# Patient Record
(demographics unavailable — no encounter records)

---

## 2024-10-30 NOTE — ASSESSMENT
[FreeTextEntry1] : anemia: Recommended continue iron. He will reschedule with Dr Campbell. Check labs.  Depression: Discussed increasing mirtazapine 15mg QHS.  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

## 2024-10-30 NOTE — HISTORY OF PRESENT ILLNESS
[de-identified] : 69M presents for follow-up of anemia, depression. He was unable to do prep for EGD so it had to be rescheduled. Due for bloodwork. Family reports increased difficulty sleeping.

## 2024-10-30 NOTE — PHYSICAL EXAM
[No Acute Distress] : no acute distress [Normal Sclera/Conjunctiva] : normal sclera/conjunctiva [PERRL] : pupils equal round and reactive to light [EOMI] : extraocular movements intact [No Lymphadenopathy] : no lymphadenopathy [Supple] : supple [No Respiratory Distress] : no respiratory distress  [No Accessory Muscle Use] : no accessory muscle use [Clear to Auscultation] : lungs were clear to auscultation bilaterally [Normal Rate] : normal rate  [Regular Rhythm] : with a regular rhythm [Normal S1, S2] : normal S1 and S2 [No Edema] : there was no peripheral edema [Soft] : abdomen soft [Non Tender] : non-tender [Non-distended] : non-distended [Normal Bowel Sounds] : normal bowel sounds [Normal Affect] : the affect was normal [Normal Insight/Judgement] : insight and judgment were intact

## 2024-11-19 NOTE — PHYSICAL EXAM
[2+] : left 2+ [Alert] : alert [Oriented to Person] : oriented to person [Oriented to Place] : oriented to place [Oriented to Time] : oriented to time [Calm] : calm [Ankle Swelling (On Exam)] : not present [Varicose Veins Of Lower Extremities] : not present [] : not present [Abdomen Tenderness] : ~T ~M No abdominal tenderness [de-identified] : NAD [de-identified] : supple, no masses [de-identified] : unlabored breathing [de-identified] : FROM of all 4 extremities

## 2024-11-19 NOTE — ASSESSMENT
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 Patient has a history of mild dementia and is unable to articulate specific complaints other than left arm and shoulder pain.  Mesenteric duplex performed in the office today demonstrates patent aorta, SMA and FAWN. Celiac artery unable to be visualized. No evidence of stenosis or dissection.  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 No extension into mesenteric arteries. Without contrast, unable to evaluate patency or stenosis of aorta/mesenteric arteries.  Plan Patient without symptoms of chronic or acute mesenteric ischemia Risk of CT with IV contrast outweighs benefit for imaging study.  Patient with history of carotid stenosis s/p L CEA in the past, without surveillance follow up. He will follow up in 1-3 months for bilateral carotid duplex to assess for stenosis.

## 2024-11-19 NOTE — HISTORY OF PRESENT ILLNESS
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 Patient has a history of mild dementia and is unable to articulate specific complaints other than left arm and shoulder pain. Denies postprandial abdominal pain, nausea, vomiting, weight loss, change in bowel habits. He has history of CKD stage 3 and last CT scan abd/pelvis was without IV contrast. No evidence of significant calcification to suggest arterial stenosis or occlusion.

## 2024-12-04 NOTE — ADDENDUM
[FreeTextEntry1] :  Jacque SMITH assisted in documentation on 05/29/2024  acting as a scribe for Dr. Cricket Tran.

## 2024-12-15 NOTE — END OF VISIT
[Time Spent: ___ minutes] : I have spent [unfilled] minutes of time on the encounter which excludes teaching and separately reported services. [FreeTextEntry3] : Patient was seen with Physician assistant and examined by me. Agree with above note, where necessary edits were made.  Parts of this note were generated by using Dragon medical dictation software.  A reasonable effort was made to proofread and correct its contents, but typos and mistakes may still remain.If there are any questions or points of clarification needed, please contact my office.   Prior to appointment and during encounter with patient extensive medical records were reviewed including but not limited to, hospital records, outpatient records, imaging results and lab data if available. During this appointment the patient was examined, diagnoses were discussed and explained in a face-to-face manner. In addition, extensive time was spent reviewing aforementioned diagnostic studies. Counseling including test results, differential diagnoses, treatment options, risk and benefits, lifestyle changes, current condition, and current medications was performed. Patient's questions and concerns were addressed. Patient verbalized understanding of the treatment plan. Time spent is for reviewing chart, labs and images if available, counseling and care coordination.

## 2024-12-15 NOTE — HISTORY OF PRESENT ILLNESS
DISPLAY PLAN FREE TEXT [FreeTextEntry1] : 12/04/2024: 69-year-old male presents for follow-up. Accompanied by his daughter who was helping translate: [place].  Forgot to obtain repeat 6-month PSA. Reports reasonable stream, urinates 2-3 times or so during the day and nocturia x1. Denies hesitancy, straining, intermittency, urgency, incontinence or sense of incomplete emptying. Denies dysuria, hematuria, lower abdominal or flank pain, fever, chills or rigors.  PSA: 5.41 (5/7/2024).  MRI prostate (5/18/24): Prostate volume: 47 cc. No MRI-targetable lesion. PIRADS 2.   Seen on 5/10/24 for Elevated PSA.  Patient had dementia.  History provided by accompanying ex-wife. Recently had PSA checked and was told is elevated.  Denied any recent unintentional weight loss, night sweats and new bone or back pain. No family history of Prostate cancer. Reported reasonable stream, urinates 2-3 times or so during the day and nocturia of 1 time. Denied hesitancy, straining, intermittency, urgency, incontinence or sense of incomplete emptying.

## 2024-12-15 NOTE — PHYSICAL EXAM
[Normal Appearance] : normal appearance [General Appearance - In No Acute Distress] : no acute distress [] : no respiratory distress [Normal Station and Gait] : the gait and station were normal for the patient's age [Oriented To Time, Place, And Person] : oriented to person, place, and time [de-identified] : normal peripheral circulation

## 2024-12-15 NOTE — ASSESSMENT
[FreeTextEntry1] : Had discussed non- findings of MRI. Left inguinal hernia, patient has known about it, and it is long-standing. Has no bother with it. For bilateral avascular femoral hip necrosis, recommended seeing orthopedist.     Elevated PSA: Will continue PSA monitoring: Will do free and total PSA now and before follow-up appointment.   Benign Prostatic Hyperplasia: No bothersome lower urinary tract symptoms and minimal post void residual in the past.  Will defer management for now.  Return to office in 6 months or sooner if any issues: will do Uroflow/PVR.

## 2025-03-13 NOTE — REVIEW OF SYSTEMS
[Fever] : no fever [Chills] : no chills [Night Sweats] : no night sweats [Chest Pain] : no chest pain [Palpitations] : no palpitations [Lower Ext Edema] : no lower extremity edema [Shortness Of Breath] : no shortness of breath [Wheezing] : no wheezing [Dyspnea on Exertion] : not dyspnea on exertion [Abdominal Pain] : no abdominal pain [Nausea] : no nausea [Constipation] : no constipation [Diarrhea] : no diarrhea [Vomiting] : no vomiting [Melena] : no melena [Dysuria] : no dysuria

## 2025-03-13 NOTE — ASSESSMENT
[FreeTextEntry1] : anemia: Check CBC, b12/folate, iron/ferritin/TIBC. Treatment based on results.  Depression: Continue mirtazapine 15mg QHS. Discussed increasing to bupropion ER 300mg daily.  CKD: Check BMP, prot/cr. Referred to Dr Javier (nephrology).

## 2025-03-13 NOTE — HISTORY OF PRESENT ILLNESS
[de-identified] : 69M presents for follow-up of depression, anemia, CKD. Due for bloodwork. Daughter reports he seems more depressed with more anhedonia.

## 2025-03-13 NOTE — HISTORY OF PRESENT ILLNESS
[de-identified] : 69M presents for follow-up of depression, anemia, CKD. Due for bloodwork. Daughter reports he seems more depressed with more anhedonia.

## 2025-06-12 NOTE — HISTORY OF PRESENT ILLNESS
[FreeTextEntry1] : 6/12/25 -- Patient is a 70 years old man here with his daughter for evaluation of CKD.  He is from [place].  Worked as an automechanic.  Since having his health issues has been living with daughter.  No issues until 2023 but he never went to the doctor.  In 2023 had stroke and got carotid stent and had MI.  This course was complicated by recurrent GI bleed and stays in rehab as well as worsening dementia.  The patient's daughter reports that he feels ok mostly.  No chest pain but does have some SOB when they go on their daily walk.  He takes his medications regularly.  He has to go for additional medical work up but is frequently limited by his creatinine.  Patient was a smoker for 40 years and has a strong family history of vascular disease, stroke.  No family history of kidney disease.

## 2025-06-12 NOTE — REVIEW OF SYSTEMS
[Feeling Poorly] : not feeling poorly [Feeling Tired] : not feeling tired [Eyesight Problems] : no eyesight problems [Nosebleeds] : no nosebleeds [Chest Pain] : no chest pain [Lower Ext Edema] : no extremity edema [Shortness Of Breath] : shortness of breath [Abdominal Pain] : no abdominal pain [Vomiting] : no vomiting [Dysuria] : no dysuria [Incontinence] : no incontinence [Itching] : no itching [Change In A Mole] : no change in a mole [Dizziness] : no dizziness [Fainting] : no fainting [Anxiety] : no anxiety [Depression] : no depression [Easy Bleeding] : no tendency for easy bleeding [Easy Bruising] : no tendency for easy bruising

## 2025-06-12 NOTE — PHYSICAL EXAM
[General Appearance - Well Nourished] : well nourished [Hearing Threshold Finger Rub Not [place]] : hearing was normal [Neck Cervical Mass (___cm)] : no neck mass was observed [Jugular Venous Distention Increased] : there was no jugular-venous distention [Exaggerated Use Of Accessory Muscles For Inspiration] : no accessory muscle use [Auscultation Breath Sounds / Voice Sounds] : lungs were clear to auscultation bilaterally [Heart Sounds] : normal S1 and S2 [No CVA Tenderness] : no ~M costovertebral angle tenderness [FreeTextEntry1] : no hair over tibita [Impaired Insight] : insight and judgment were intact

## 2025-06-12 NOTE — END OF VISIT
He indicated to me today that he was staying around here this summer, and was a little bummed about it.     [Time Spent: ___ minutes] : I have spent [unfilled] minutes of time on the encounter which excludes teaching and separately reported services.

## 2025-06-12 NOTE — ASSESSMENT
[FreeTextEntry1] : 70 years old man here for evaluation of CKD   Chronic Kidney Disease Stage 3b w/o prot -- Patient's creatinine trend and imaging were reviewed.  Etiology of CKD unclear but likely related to significant vascular disease, smoking, has been told he was hypertensive even in his past but never followed up.  His baseline creatinine is in the mid 2 range.  We spent most of the visit discussing chronic kidney disease, its stages, risk for progression and strategies for prevention including avoidance of NSAIDs and notifying me of medication changes.  Will check renal panel cbc today and urine  Hypertension -- Patient's bp at home is usually controlled high here.  Asked daughter to trend daily X 1 week and call me with the results  he should see me about 3 months  The time spent is inclusive of the time it took to see, evaluate, and manage the patient.  Time is inclusive of the time taken to review chart, reconcile medications, document findings, and communicate with other providers (when applicable).

## 2025-06-19 NOTE — REASON FOR VISIT
[Symptom and Test Evaluation] : symptom and test evaluation [Cardiac Failure] : cardiac failure [Arrhythmia/ECG Abnorrmalities] : arrhythmia/ECG abnormalities [Hyperlipidemia] : hyperlipidemia [Hypertension] : hypertension [Coronary Artery Disease] : coronary artery disease [Family Member] : family member

## 2025-06-20 NOTE — HISTORY OF PRESENT ILLNESS
[FreeTextEntry1] : 70 year old male with new onset dementia , admitted to [facility] with acute  embolic CVA s/p carotid stenting  likely from atherosclerotic changes in aorta , did have NSTEMI  with LV dysfunction , CKD  , cath was not done as he had thoracic aortic atheroma , echo showed moderate to severe LV dysfunction , was suggested medical management  that was detected ,during hospitalization  in march 2023 , patient admitted with jan 2024 with severe anemia , patient was evaluated by GI and  patient had follow up echo in hospital showed improved EF  mild to moderate AI , moderate MR  brought in for follow up   today by daughter   patient says  feeling fine , does get SOB on activity without chest pain , his blood pressure is elevated, taking hydralazine 10 mg ,  coreg 25 mg po BID     patient was noted to have incidental focal suprarenal abdominal aortic dissection vs laminar mural thrombus , was seen by vascular surgeon dr lombardo

## 2025-06-20 NOTE — REVIEW OF SYSTEMS
[Memory Lapses Or Loss] : memory lapses or loss [Chills] : no chills [Blurry Vision] : no blurred vision [Earache] : no earache [SOB] : no shortness of breath [Syncope] : no syncope [Cough] : no cough [Abdominal Pain] : no abdominal pain [Urinary Frequency] : no change in urinary frequency [Joint Pain] : no joint pain [Rash] : no rash [Tremor] : no tremor was seen [Tingling (Paresthesia)] : no tingling [Confusion] : no confusion was observed [Depression] : no depression [Suicidal] : not suicidal [Easy Bleeding] : no tendency for easy bleeding [de-identified] : still mild LE weakness  improved most of it

## 2025-06-20 NOTE — PHYSICAL EXAM
[Well Developed] : well developed [Carotid Bruit] : carotid bruit [Normal S1, S2] : normal S1, S2 [No Murmur] : no murmur [No Rub] : no rub [Murmur] : murmur [Normal] : soft, non-tender, no masses/organomegaly, normal bowel sounds [Normal Gait] : normal gait [No Edema] : no edema [No Cyanosis] : no cyanosis [No Rash] : no rash [Moves all extremities] : moves all extremities [Alert and Oriented] : alert and oriented [de-identified] : left carotid bruit  [de-identified] : 2/6 SM  [de-identified] : memory issues

## 2025-06-20 NOTE — DISCUSSION/SUMMARY
[FreeTextEntry1] : Patient with above hx dementia   s/p CVA  left  carotid stent : no focal deficits , with dementia : continue statin , ecotrin   s/p NSTEMI  Moderate to severe LV dysfunction :2023 now improved EF  likely related hypertensive heart disease  no evidence of CHF , continue coreg 25 mg po BID ,    HTN Hypertensive heart disease:  abnormal EKG   elevated , likely significant salty food intake : encourage to follow up low salt diet  , increase  coreg  to 25 mg po BID ,  increase hydralazine to 25 mg po TID , home BP  Moderate MR, mild to moderate AI : compensated : will monitor with periodic echocardiogram   Hyperlipidemia :  controlled , continue atorvastatin 80 mg po daily   CKD :: anemia :   ? chronic GI bleed monitor hemoglobin , follow up with GI   [EKG obtained to assist in diagnosis and management of assessed problem(s)] : EKG obtained to assist in diagnosis and management of assessed problem(s)

## 2025-06-20 NOTE — CARDIOLOGY SUMMARY
[de-identified] : 9/26/24  sinus rhythm ST T changes no significant change  6/20/25 sinus rhythm ST T changes , lateral leads  [de-identified] : 6/21/24  EF 56%   Moderate MR , mild to moderate AI , aortic sclerosis ,